# Patient Record
Sex: MALE | Race: WHITE | Employment: OTHER | URBAN - METROPOLITAN AREA
[De-identification: names, ages, dates, MRNs, and addresses within clinical notes are randomized per-mention and may not be internally consistent; named-entity substitution may affect disease eponyms.]

---

## 2021-08-24 ENCOUNTER — APPOINTMENT (OUTPATIENT)
Dept: GENERAL RADIOLOGY | Age: 71
End: 2021-08-24
Payer: MEDICARE

## 2021-08-24 ENCOUNTER — HOSPITAL ENCOUNTER (EMERGENCY)
Age: 71
Discharge: HOME | End: 2021-08-24
Attending: EMERGENCY MEDICINE
Payer: MEDICARE

## 2021-08-24 VITALS
TEMPERATURE: 98.1 F | WEIGHT: 144.5 LBS | HEART RATE: 74 BPM | SYSTOLIC BLOOD PRESSURE: 145 MMHG | DIASTOLIC BLOOD PRESSURE: 90 MMHG | BODY MASS INDEX: 23.22 KG/M2 | RESPIRATION RATE: 15 BRPM | HEIGHT: 66 IN | OXYGEN SATURATION: 97 %

## 2021-08-24 DIAGNOSIS — S81.812A LACERATION OF LEFT LOWER EXTREMITY, INITIAL ENCOUNTER: Primary | ICD-10-CM

## 2021-08-24 PROCEDURE — 73590 X-RAY EXAM OF LOWER LEG: CPT

## 2021-08-24 PROCEDURE — 99283 EMERGENCY DEPT VISIT LOW MDM: CPT

## 2021-08-24 PROCEDURE — 6370000000 HC RX 637 (ALT 250 FOR IP): Performed by: EMERGENCY MEDICINE

## 2021-08-24 PROCEDURE — 12034 INTMD RPR S/TR/EXT 7.6-12.5: CPT

## 2021-08-24 RX ORDER — BACITRACIN ZINC AND POLYMYXIN B SULFATE 500; 1000 [USP'U]/G; [USP'U]/G
OINTMENT TOPICAL ONCE
Status: DISCONTINUED | OUTPATIENT
Start: 2021-08-24 | End: 2021-08-24 | Stop reason: SDUPTHER

## 2021-08-24 RX ORDER — LIDOCAINE HYDROCHLORIDE AND EPINEPHRINE 10; 10 MG/ML; UG/ML
20 INJECTION, SOLUTION INFILTRATION; PERINEURAL ONCE
Status: DISCONTINUED | OUTPATIENT
Start: 2021-08-24 | End: 2021-08-24 | Stop reason: HOSPADM

## 2021-08-24 RX ORDER — BACITRACIN, NEOMYCIN, POLYMYXIN B 400; 3.5; 5 [USP'U]/G; MG/G; [USP'U]/G
OINTMENT TOPICAL ONCE
Status: DISCONTINUED | OUTPATIENT
Start: 2021-08-24 | End: 2021-08-24 | Stop reason: HOSPADM

## 2021-08-24 RX ORDER — CEPHALEXIN 500 MG/1
500 CAPSULE ORAL ONCE
Status: COMPLETED | OUTPATIENT
Start: 2021-08-24 | End: 2021-08-24

## 2021-08-24 RX ORDER — ACETAMINOPHEN 500 MG
1000 TABLET ORAL EVERY 6 HOURS PRN
Qty: 30 TABLET | Refills: 1 | Status: SHIPPED | OUTPATIENT
Start: 2021-08-24

## 2021-08-24 RX ORDER — CEPHALEXIN 500 MG/1
500 CAPSULE ORAL 3 TIMES DAILY
Qty: 21 CAPSULE | Refills: 0 | Status: SHIPPED | OUTPATIENT
Start: 2021-08-24 | End: 2021-08-31

## 2021-08-24 RX ADMIN — CEPHALEXIN 500 MG: 500 CAPSULE ORAL at 11:47

## 2021-08-24 ASSESSMENT — PAIN DESCRIPTION - ORIENTATION: ORIENTATION: LEFT

## 2021-08-24 ASSESSMENT — PAIN DESCRIPTION - LOCATION: LOCATION: LEG

## 2021-08-24 ASSESSMENT — PAIN SCALES - GENERAL: PAINLEVEL_OUTOF10: 7

## 2021-08-24 ASSESSMENT — PAIN DESCRIPTION - DESCRIPTORS: DESCRIPTORS: PATIENT UNABLE TO DESCRIBE

## 2021-08-24 ASSESSMENT — PAIN DESCRIPTION - PAIN TYPE: TYPE: ACUTE PAIN

## 2021-08-27 ENCOUNTER — HOSPITAL ENCOUNTER (EMERGENCY)
Age: 71
Discharge: HOME | End: 2021-08-27
Attending: EMERGENCY MEDICINE
Payer: MEDICARE

## 2021-08-27 VITALS
WEIGHT: 145.3 LBS | TEMPERATURE: 98 F | HEART RATE: 86 BPM | OXYGEN SATURATION: 97 % | BODY MASS INDEX: 23.35 KG/M2 | HEIGHT: 66 IN | RESPIRATION RATE: 15 BRPM | DIASTOLIC BLOOD PRESSURE: 60 MMHG | SYSTOLIC BLOOD PRESSURE: 116 MMHG

## 2021-08-27 DIAGNOSIS — Z51.89 VISIT FOR WOUND CHECK: Primary | ICD-10-CM

## 2021-08-27 PROCEDURE — 99284 EMERGENCY DEPT VISIT MOD MDM: CPT

## 2021-08-27 RX ORDER — BACITRACIN, NEOMYCIN, POLYMYXIN B 400; 3.5; 5 [USP'U]/G; MG/G; [USP'U]/G
OINTMENT TOPICAL ONCE
Status: DISCONTINUED | OUTPATIENT
Start: 2021-08-27 | End: 2021-08-27 | Stop reason: HOSPADM

## 2021-08-27 NOTE — ED NOTES
Patient to ed for a wound check on his left lower leg, wound is healing patient has no complaints.      Kameron Lazo RN  08/27/21 [id number]

## 2021-08-27 NOTE — ED NOTES
Patient given  discharge instructions verbal and written, patient verbalized understanding. Alert/oriented X4, Clear speech.   Patient exhibits no distress, ambulates with steady gait per self leaving unit, no further request.     Carley Coon RN  08/27/21 [id number]

## 2021-08-27 NOTE — ED PROVIDER NOTES
TRIAGE CHIEF COMPLAINT:   Chief Complaint   Patient presents with    Wound Check     left leg sutures         HPI: Farhat Willams is a 66 y.o. male who presents to the Emergency Department with complaint of wound check for laceration of the left lower extremity that I repaired in a 2 layer fashion on August 24. Patient has no complaints. He is taking his antibiotic Keflex. He states he has not been cleaning the wound since the left ear. REVIEW OF SYSTEMS:  6 systems reviewed. Pertinent positives per HPI. Otherwise noted to be negative. Nursing notes reviewed and agree with above. Past medical/surgical history reviewed. MEDICATIONS   Discharge Medication List as of 8/27/2021  3:35 PM      CONTINUE these medications which have NOT CHANGED    Details   Cyanocobalamin (B-12 IJ) Inject as directedHistorical Med      Calcium Acetate, Phos Binder, (CALCIUM ACETATE PO) Take by mouthHistorical Med      cephALEXin (KEFLEX) 500 MG capsule Take 1 capsule by mouth 3 times daily for 7 days, Disp-21 capsule, R-0Print      LISINOPRIL PO Take by mouthHistorical Med      UNKNOWN TO PATIENT Historical Med      IBUPROFEN PO Take by mouthHistorical Med      acetaminophen (APAP EXTRA STRENGTH) 500 MG tablet Take 2 tablets by mouth every 6 hours as needed for Pain, Disp-30 tablet, R-1Print               ALLERGIES No Known Allergies      /60   Pulse 86   Temp 98 °F (36.7 °C) (Oral)   Resp 15   Ht 5' 6\" (1.676 m)   Wt 145 lb 4.8 oz (65.9 kg)   SpO2 97%   BMI 23.45 kg/m²   General:  No acute distress. Non toxic appearance  Head:   Normocephalic and atraumatic  Eyes:   Conjunctiva clear, EMMAUNEL, EOM's intact. Sclera anicteric. ENT:   Mucous membranes moist  Neck:   Supple. No adenopathy. Lungs/Chest:  No respiratory distress  CVS:   Regular rate and rhythm  Extremities:  Full range of motion. Sutured 10 cm laceration on the left lower leg pretibial area with some minimal erythema but no lymphangitic streaking.   He has trace pedal edema and admits he has not been elevating his leg much. Laceration is well opposed with no drainage. Skin:   No rashes or lesions to exposed skin  Neuro:  Alert and OX3. Speech clear and appropriate. No extremity weakness. Normal sensation in all extremities. No facial asymmetry. Gait normal.  Psych:   Affect normal. Mood normal        RADIOLOGY:      LAB      PROCEDURES:   The laceration was cleaned by the patient's nurse with dilute Hibiclens then Polysporin was placed and a nonstick dressing was applied. ED COURSE / MDM:  68-year-old male here for wound recheck from large laceration on his left lower leg pretibial area that was repaired by myself in a 2 layer fashion on August 24. He is on Keflex. Patient was once again given wound care instructions and advised to rest and elevate his leg, wear the Ace wrap over the dressing when he is up walking around and to clean it twice a day with mild soap and water then apply over-the-counter antibiotic ointment. He is to return here in about 8 days for suture removal.      I discussed with Adri Raines the results of evaluation in the Emergency Department, diagnosis, care and prognosis. The plan is to discharge to home. The patient is in agreement with the plan and questions have been answered. I also discussed with the patient and/or family the reasons which may require a return visit and the importance of follow-up care.        (Please note that portions of this note may have been completed with a voice recognition program.  Efforts were made to edit the dictation but occasionally words are mis-transcribed)        FINAL IMPRESSION:  1 --wound check laceration of left lower extremity            Eran Hoyt MD  08/27/21 Alyson Carrasquillo

## 2021-09-03 ENCOUNTER — HOSPITAL ENCOUNTER (EMERGENCY)
Age: 71
Discharge: HOME | End: 2021-09-03
Attending: EMERGENCY MEDICINE
Payer: MEDICARE

## 2021-09-03 VITALS
BODY MASS INDEX: 23.31 KG/M2 | RESPIRATION RATE: 18 BRPM | SYSTOLIC BLOOD PRESSURE: 135 MMHG | WEIGHT: 145.06 LBS | HEART RATE: 76 BPM | OXYGEN SATURATION: 98 % | DIASTOLIC BLOOD PRESSURE: 63 MMHG | TEMPERATURE: 98.4 F | HEIGHT: 66 IN

## 2021-09-03 DIAGNOSIS — Z48.02 VISIT FOR SUTURE REMOVAL: Primary | ICD-10-CM

## 2021-09-03 PROCEDURE — 99283 EMERGENCY DEPT VISIT LOW MDM: CPT

## 2021-09-03 NOTE — ED NOTES
Patient given discharge instructions verbal and written, patient verbalized understanding. Alert/oriented X4, Clear speech.   Patient exhibits no distress, ambulates with steady gait per self leaving unit, no further request.     Silver Hughes, RN  09/03/21 [id number]

## 2021-09-03 NOTE — ED PROVIDER NOTES
CHIEF COMPLAINT  Suture / Staple Removal (left leg)      HISTORY OF PRESENT ILLNESS  Colin Mckeon  is a 66 y.o. male who presents to the ED for suture removal.  He was apparently seen 2 weeks ago after a broken TV cut his left lower leg. He was placed on antibiotics and is taking these as instructed. He states there has been redness around the wound since the day he injured it and that is no worse now. He denies fevers. He denies any drainage from the wound. There are no other complaints, modifying factors or associated symptoms. Nursing notes reviewed. Past medical history:  has a past medical history of Hypertension. Past surgical history:  has no past surgical history on file. Home medications:   Prior to Admission medications    Medication Sig Start Date End Date Taking? Authorizing Provider   LISINOPRIL PO Take by mouth   Yes Historical Provider, MD   Cyanocobalamin (B-12 IJ) Inject as directed   Yes Historical Provider, MD   Calcium Acetate, Phos Binder, (CALCIUM ACETATE PO) Take by mouth   Yes Historical Provider, MD   UNKNOWN TO PATIENT     Historical Provider, MD   IBUPROFEN PO Take by mouth    Historical Provider, MD   acetaminophen (APAP EXTRA STRENGTH) 500 MG tablet Take 2 tablets by mouth every 6 hours as needed for Pain 8/24/21   Nisha Loja MD       No Known Allergies    Social history:  reports that he has quit smoking. He has never used smokeless tobacco. He reports current alcohol use. He reports previous drug use. Family history:  History reviewed. No pertinent family history. REVIEW OF SYSTEMS  6 systems reviewed, pertinent positives per HPI otherwise noted to be negative    PHYSICAL EXAM  Vitals:    09/03/21 1152   BP: 135/63   Pulse: 76   Resp: 18   Temp: 98.4 °F (36.9 °C)   SpO2: 98%     GENERAL APPEARANCE: Awake and alert. Cooperative. No acute distress  HEENT:  Normocephalic, atraumatic. PERRL.   Conjunctiva appear normal.  External ears are normal. MMM  NECK: Supple with normal ROM. Trachea midline  HEART: Regular rate. LUNGS:  Normal work of breathing. Speaking comfortably in full sentences. ABDOMEN: Non-distended. EXTREMITIES: 2+ distal pulses w/o edema. MUSCULOSKELETAL:  Atraumatic extremities with normal ROM grossly. No obvious bony deformities. SKIN: Warm/dry. Laceration on the left lower leg with sutures in place. There is some surrounding erythema and warmth but no drainage from the wound. .   PSYCHIATRIC: Patient is alert and oriented with normal affect  NEUROLOGIC: Cranial nerves grossly intact. Moves all extremities with equal strength. No gross sensory deficits. Answers questions/follows commands appropriately. ED COURSE/MDM  Nursing notes reviewed. Here the patient is afebrile with normal vitals. Patient is well appearing. I encouraged her the patient stated this erythema around the wound has been present throughout. I think his sutures are likely a little overdue and need to come out. He may be having some local reaction to the sutures. He still has antibiotics left at home. We will remove his sutures here and apply Steri-Strips. I have instructed him to finish his antibiotics but if his erythema gets any worse or he develops fever or swelling around the wound he needs to return immediately. He states understanding. Clinical Impression  Based on the presenting complaint, history, and physical exam, multiple diagnoses were considered. Exam and workup here most c/w:  1. Visit for suture removal        I discussed with the patient, the results of evaluation in the ED, diagnosis, care, and prognosis. The plan is to discharge to home. Patient is in agreement with plan and questions have been answered. I also discussed the reasons which may require a return visit and the importance of follow-up care with the patient. The patient is well-appearing, nontoxic, and improved at the time of discharge.   Patient agrees to call to arrange follow-up care as directed. The patient understands to return immediately for worsening/change in symptoms. Patient will be started on the following medications from the ED:  Discharge Medication List as of 9/3/2021 12:25 PM            Disposition  Pt is discharged in stable condition.     Disposition Vitals:  /63   Pulse 76   Temp 98.4 °F (36.9 °C) (Oral)   Resp 18   Ht 5' 6\" (1.676 m)   Wt 145 lb 1 oz (65.8 kg)   SpO2 98%   BMI 23.41 kg/m²        Kailey Peters MD  09/06/21 [id number]

## 2021-09-03 NOTE — ED TRIAGE NOTES
Patient to ed for suture removal of left leg, wound healing, redness surrounds the suture area, patient denies pain patient is finishing his antibiotics. 24 sutures removed after md harrison, steri-strips in place.